# Patient Record
Sex: FEMALE | Race: WHITE | NOT HISPANIC OR LATINO | URBAN - METROPOLITAN AREA
[De-identification: names, ages, dates, MRNs, and addresses within clinical notes are randomized per-mention and may not be internally consistent; named-entity substitution may affect disease eponyms.]

---

## 2023-09-11 PROBLEM — L40.9 PSORIASIS: Status: ACTIVE | Noted: 2023-09-11

## 2023-09-11 PROBLEM — L40.50 PSORIATIC ARTHRITIS OF MULTIPLE JOINTS (MULTI): Status: ACTIVE | Noted: 2023-09-11

## 2023-09-11 PROBLEM — Z85.3 HISTORY OF BREAST CANCER: Status: ACTIVE | Noted: 2023-09-11

## 2023-09-11 PROBLEM — N76.3 CHRONIC VULVITIS: Status: ACTIVE | Noted: 2023-09-11

## 2023-09-11 PROBLEM — N95.2 ATROPHIC VAGINITIS: Status: ACTIVE | Noted: 2023-09-11

## 2023-09-11 PROBLEM — Z85.89 HISTORY OF SQUAMOUS CELL CARCINOMA: Status: ACTIVE | Noted: 2023-09-11

## 2023-09-11 RX ORDER — DESONIDE 0.5 MG/ML
LOTION TOPICAL
COMMUNITY

## 2023-09-11 RX ORDER — COLLAGENASE CLOSTRIDIUM HIST.
POWDER (EA) MISCELLANEOUS
COMMUNITY

## 2023-09-11 RX ORDER — BUTYROSPERMUM PARKII(SHEA BUTTER), SIMMONDSIA CHINENSIS (JOJOBA) SEED OIL, ALOE BARBADENSIS LEAF EXTRACT .01; 1; 3.5 G/100G; G/100G; G/100G
1 LIQUID TOPICAL 2 TIMES DAILY
COMMUNITY

## 2023-09-11 RX ORDER — APREMILAST 30 MG/1
30 TABLET, FILM COATED ORAL 2 TIMES DAILY
COMMUNITY

## 2023-09-11 RX ORDER — ESTRADIOL 0.1 MG/G
1 CREAM VAGINAL 2 TIMES WEEKLY
COMMUNITY
Start: 2022-06-06

## 2023-09-11 RX ORDER — CLOTRIMAZOLE AND BETAMETHASONE DIPROPIONATE 10; .64 MG/G; MG/G
1 CREAM TOPICAL 2 TIMES DAILY
COMMUNITY
Start: 2022-06-06

## 2023-09-25 ENCOUNTER — HOSPITAL ENCOUNTER (OUTPATIENT)
Dept: DATA CONVERSION | Facility: HOSPITAL | Age: 58
Discharge: HOME | End: 2023-09-25
Payer: COMMERCIAL

## 2023-09-25 DIAGNOSIS — Z12.31 ENCOUNTER FOR SCREENING MAMMOGRAM FOR MALIGNANT NEOPLASM OF BREAST: ICD-10-CM

## 2024-06-17 ENCOUNTER — HOSPITAL ENCOUNTER (OUTPATIENT)
Dept: RADIOLOGY | Facility: CLINIC | Age: 59
Discharge: HOME | End: 2024-06-17
Payer: COMMERCIAL

## 2024-06-17 ENCOUNTER — OFFICE VISIT (OUTPATIENT)
Dept: OBSTETRICS AND GYNECOLOGY | Facility: CLINIC | Age: 59
End: 2024-06-17
Payer: COMMERCIAL

## 2024-06-17 VITALS — WEIGHT: 208 LBS | BODY MASS INDEX: 32.65 KG/M2 | HEIGHT: 67 IN

## 2024-06-17 VITALS
BODY MASS INDEX: 32.71 KG/M2 | WEIGHT: 208.4 LBS | DIASTOLIC BLOOD PRESSURE: 80 MMHG | SYSTOLIC BLOOD PRESSURE: 132 MMHG | HEIGHT: 67 IN

## 2024-06-17 DIAGNOSIS — Z01.419 VISIT FOR GYNECOLOGIC EXAMINATION: Primary | ICD-10-CM

## 2024-06-17 DIAGNOSIS — Z90.710 HISTORY OF HYSTERECTOMY: ICD-10-CM

## 2024-06-17 DIAGNOSIS — Z85.3 HISTORY OF BREAST CANCER: ICD-10-CM

## 2024-06-17 DIAGNOSIS — N76.3 CHRONIC VULVITIS: ICD-10-CM

## 2024-06-17 DIAGNOSIS — Z12.11 SCREEN FOR COLON CANCER: ICD-10-CM

## 2024-06-17 DIAGNOSIS — Z12.31 ENCOUNTER FOR SCREENING MAMMOGRAM FOR MALIGNANT NEOPLASM OF BREAST: ICD-10-CM

## 2024-06-17 DIAGNOSIS — L40.9 PSORIASIS: ICD-10-CM

## 2024-06-17 DIAGNOSIS — N95.2 POSTMENOPAUSAL ATROPHIC VAGINITIS: ICD-10-CM

## 2024-06-17 PROCEDURE — 77063 BREAST TOMOSYNTHESIS BI: CPT | Performed by: RADIOLOGY

## 2024-06-17 PROCEDURE — 99396 PREV VISIT EST AGE 40-64: CPT | Performed by: OBSTETRICS & GYNECOLOGY

## 2024-06-17 PROCEDURE — 77067 SCR MAMMO BI INCL CAD: CPT

## 2024-06-17 PROCEDURE — 1036F TOBACCO NON-USER: CPT | Performed by: OBSTETRICS & GYNECOLOGY

## 2024-06-17 PROCEDURE — 77067 SCR MAMMO BI INCL CAD: CPT | Performed by: RADIOLOGY

## 2024-06-17 RX ORDER — ESTRADIOL 0.1 MG/G
1 CREAM VAGINAL 2 TIMES WEEKLY
Qty: 42.5 G | Refills: 2 | Status: SHIPPED | OUTPATIENT
Start: 2024-06-17

## 2024-06-17 ASSESSMENT — ENCOUNTER SYMPTOMS
DEPRESSION: 0
LOSS OF SENSATION IN FEET: 0
ABDOMINAL DISTENTION: 0
ABDOMINAL PAIN: 0
DIZZINESS: 0
DIFFICULTY URINATING: 0
CHEST TIGHTNESS: 0
FATIGUE: 0
COLOR CHANGE: 0
UNEXPECTED WEIGHT CHANGE: 0
SHORTNESS OF BREATH: 0
ACTIVITY CHANGE: 0
HEADACHES: 0
JOINT SWELLING: 0
DYSURIA: 0
ADENOPATHY: 0
WEAKNESS: 0
OCCASIONAL FEELINGS OF UNSTEADINESS: 0

## 2024-06-17 ASSESSMENT — PATIENT HEALTH QUESTIONNAIRE - PHQ9
SUM OF ALL RESPONSES TO PHQ9 QUESTIONS 1 & 2: 0
1. LITTLE INTEREST OR PLEASURE IN DOING THINGS: NOT AT ALL
2. FEELING DOWN, DEPRESSED OR HOPELESS: NOT AT ALL

## 2024-06-17 ASSESSMENT — LIFESTYLE VARIABLES
HOW OFTEN DO YOU HAVE A DRINK CONTAINING ALCOHOL: 2-4 TIMES A MONTH
HOW MANY STANDARD DRINKS CONTAINING ALCOHOL DO YOU HAVE ON A TYPICAL DAY: 1 OR 2
AUDIT-C TOTAL SCORE: 2
HOW OFTEN DO YOU HAVE SIX OR MORE DRINKS ON ONE OCCASION: NEVER
SKIP TO QUESTIONS 9-10: 1

## 2024-06-17 ASSESSMENT — PAIN SCALES - GENERAL: PAINLEVEL: 0-NO PAIN

## 2024-06-17 NOTE — PROGRESS NOTES
"Annual-menopause  Subjective   Katlin Bell \"Jodi\" is a 58 y.o. former pt Dr. Iraheta, who is here for a routine exam.   Complaints:  denies vag bleed or discharge; denies pelvic  pain, pressure, or persistent bloating.  PMHx: LEFT triple neg breast CA/surg [facility]- [facility]cology-Dr. Ellsworth- at age 36 lumpec/chemo/xrt.      squamous cell CA nose. Psoriasis. Dr. Samayoa       Endometriosis; JOVITA/BSO  per LV       2014 ,2019 polyps/divertics each time; rpt due 2024.       hematuria, hx of cystoscopy 2014 Dr. Gayle.        earing loss right ear.  Surg Hx: JOVITA BSO- Dr. Iraheta 2004        TONSILLECTOMY        LUMPECTOMY- Dr. Escobedo 1/2002        sigmoidoscopy--Dr. Kumar 2010        Dr. Arias removed squamous cell CA nose 2011  Pt adopted   Review of Systems   Constitutional:  Negative for activity change, fatigue and unexpected weight change.   Respiratory:  Negative for chest tightness and shortness of breath.    Cardiovascular:  Negative for chest pain and leg swelling.   Gastrointestinal:  Negative for abdominal distention and abdominal pain.   Genitourinary:  Negative for difficulty urinating, dysuria, genital sores, pelvic pain, vaginal bleeding, vaginal discharge and vaginal pain.   Musculoskeletal:  Negative for gait problem and joint swelling.   Skin:  Negative for color change and rash.   Neurological:  Negative for dizziness, weakness and headaches.   Hematological:  Negative for adenopathy.   Objective   /80   Ht 1.702 m (5' 7\")   Wt 94.5 kg (208 lb 6.4 oz)   BMI 32.64 kg/m²    General:   Alert and oriented, in no acute distress   Neck: Supple. No visible thyromegaly.    Breast/Axilla: LEFT chest oblique incision crosses entire upper half breast/another incision along axilla from lnd; Normal to palpation bilaterally without masses, skin changes, or nipple discharge.    Abdomen: Soft, non-tender, without masses or organomegaly   Vulva: RT minora has white/gray/rough mucosa midway, approx 1cm; atrophic " red mucosa inside both labia; bottom of interlabial creases have fissuring   Vagina: Normal vault capacity ;mucosa without lesions, masses.  Positive atrophy. No abnormal vaginal discharge.    Cervix: Surg absent   Uterus: Surg absent   Adnexa: Surg absent   Pelvic Floor No POP noted. No high tone pelvic floor    Psych  Rectal Normal affect. Normal mood.     Assessment/Plan   Encounter Diagnoses   Name Primary?    Visit for gynecologic examination; grossly benign breast exam; gyn exam pos for atrophy/posspsoriasis vs lichen sclerosus R minora. Yes    Encounter for screening mammogram for malignant neoplasm of breast; req given     History of breast cancer; no  evid dz.     Postmenopausal atrophic vaginitis; wanting to cont vag ert     Chronic vulvitis;improved w tx.     Psoriasis     History of hysterectomy; no further paps     Screen for colon cancer; utd and neg per pt     Renetta Carson MD

## 2024-09-30 ENCOUNTER — LAB (OUTPATIENT)
Dept: LAB | Facility: LAB | Age: 59
End: 2024-09-30
Payer: COMMERCIAL

## 2024-09-30 DIAGNOSIS — L40.50 ARTHROPATHIC PSORIASIS, UNSPECIFIED (MULTI): ICD-10-CM

## 2024-09-30 DIAGNOSIS — L40.0 PSORIASIS VULGARIS: Primary | ICD-10-CM

## 2024-09-30 LAB
ALBUMIN SERPL BCP-MCNC: 4.5 G/DL (ref 3.4–5)
ALP SERPL-CCNC: 48 U/L (ref 33–136)
ALT SERPL W P-5'-P-CCNC: 17 U/L (ref 7–45)
ANION GAP SERPL CALCULATED.3IONS-SCNC: 10 MMOL/L (ref 10–20)
APPEARANCE UR: CLEAR
AST SERPL W P-5'-P-CCNC: 17 U/L (ref 9–39)
BASOPHILS # BLD AUTO: 0.08 X10*3/UL (ref 0–0.1)
BASOPHILS NFR BLD AUTO: 1 %
BILIRUB SERPL-MCNC: 0.5 MG/DL (ref 0–1.2)
BILIRUB UR STRIP.AUTO-MCNC: NEGATIVE MG/DL
BUN SERPL-MCNC: 13 MG/DL (ref 6–23)
CALCIUM SERPL-MCNC: 9.8 MG/DL (ref 8.6–10.3)
CHLORIDE SERPL-SCNC: 105 MMOL/L (ref 98–107)
CO2 SERPL-SCNC: 28 MMOL/L (ref 21–32)
COLOR UR: COLORLESS
CREAT SERPL-MCNC: 0.93 MG/DL (ref 0.5–1.05)
EGFRCR SERPLBLD CKD-EPI 2021: 71 ML/MIN/1.73M*2
EOSINOPHIL # BLD AUTO: 0.17 X10*3/UL (ref 0–0.7)
EOSINOPHIL NFR BLD AUTO: 2.2 %
ERYTHROCYTE [DISTWIDTH] IN BLOOD BY AUTOMATED COUNT: 12.6 % (ref 11.5–14.5)
GLUCOSE SERPL-MCNC: 97 MG/DL (ref 74–99)
GLUCOSE UR STRIP.AUTO-MCNC: NORMAL MG/DL
HCT VFR BLD AUTO: 41.5 % (ref 36–46)
HGB BLD-MCNC: 13.6 G/DL (ref 12–16)
IMM GRANULOCYTES # BLD AUTO: 0.02 X10*3/UL (ref 0–0.7)
IMM GRANULOCYTES NFR BLD AUTO: 0.3 % (ref 0–0.9)
KETONES UR STRIP.AUTO-MCNC: NEGATIVE MG/DL
LEUKOCYTE ESTERASE UR QL STRIP.AUTO: ABNORMAL
LYMPHOCYTES # BLD AUTO: 2.11 X10*3/UL (ref 1.2–4.8)
LYMPHOCYTES NFR BLD AUTO: 27.2 %
MCH RBC QN AUTO: 29.9 PG (ref 26–34)
MCHC RBC AUTO-ENTMCNC: 32.8 G/DL (ref 32–36)
MCV RBC AUTO: 91 FL (ref 80–100)
MONOCYTES # BLD AUTO: 0.46 X10*3/UL (ref 0.1–1)
MONOCYTES NFR BLD AUTO: 5.9 %
MUCOUS THREADS #/AREA URNS AUTO: NORMAL /LPF
NEUTROPHILS # BLD AUTO: 4.92 X10*3/UL (ref 1.2–7.7)
NEUTROPHILS NFR BLD AUTO: 63.4 %
NITRITE UR QL STRIP.AUTO: NEGATIVE
NRBC BLD-RTO: 0 /100 WBCS (ref 0–0)
PH UR STRIP.AUTO: 5 [PH]
PLATELET # BLD AUTO: 321 X10*3/UL (ref 150–450)
POTASSIUM SERPL-SCNC: 4.2 MMOL/L (ref 3.5–5.3)
PROT SERPL-MCNC: 7.2 G/DL (ref 6.4–8.2)
PROT UR STRIP.AUTO-MCNC: NEGATIVE MG/DL
RBC # BLD AUTO: 4.55 X10*6/UL (ref 4–5.2)
RBC # UR STRIP.AUTO: ABNORMAL /UL
RBC #/AREA URNS AUTO: NORMAL /HPF
SODIUM SERPL-SCNC: 139 MMOL/L (ref 136–145)
SP GR UR STRIP.AUTO: 1.01
UROBILINOGEN UR STRIP.AUTO-MCNC: NORMAL MG/DL
WBC # BLD AUTO: 7.8 X10*3/UL (ref 4.4–11.3)
WBC #/AREA URNS AUTO: NORMAL /HPF

## 2024-09-30 PROCEDURE — 85025 COMPLETE CBC W/AUTO DIFF WBC: CPT

## 2024-09-30 PROCEDURE — 86481 TB AG RESPONSE T-CELL SUSP: CPT

## 2024-09-30 PROCEDURE — 87389 HIV-1 AG W/HIV-1&-2 AB AG IA: CPT

## 2024-09-30 PROCEDURE — 86780 TREPONEMA PALLIDUM: CPT

## 2024-09-30 PROCEDURE — 36415 COLL VENOUS BLD VENIPUNCTURE: CPT

## 2024-09-30 PROCEDURE — 87340 HEPATITIS B SURFACE AG IA: CPT

## 2024-09-30 PROCEDURE — 86803 HEPATITIS C AB TEST: CPT

## 2024-09-30 PROCEDURE — 81001 URINALYSIS AUTO W/SCOPE: CPT

## 2024-09-30 PROCEDURE — 80053 COMPREHEN METABOLIC PANEL: CPT

## 2024-10-01 LAB
HBV SURFACE AG SERPL QL IA: NONREACTIVE
HCV AB SER QL: NONREACTIVE
HIV 1+2 AB+HIV1 P24 AG SERPL QL IA: NONREACTIVE
TREPONEMA PALLIDUM IGG+IGM AB [PRESENCE] IN SERUM OR PLASMA BY IMMUNOASSAY: NONREACTIVE

## 2024-10-03 LAB
NIL(NEG) CONTROL SPOT COUNT: NORMAL
PANEL A SPOT COUNT: 1
PANEL B SPOT COUNT: 1
POS CONTROL SPOT COUNT: NORMAL
T-SPOT. TB INTERPRETATION: NEGATIVE

## 2024-10-08 ENCOUNTER — APPOINTMENT (OUTPATIENT)
Dept: PRIMARY CARE | Facility: CLINIC | Age: 59
End: 2024-10-08
Payer: COMMERCIAL

## 2024-11-07 PROBLEM — L03.90 CELLULITIS: Status: RESOLVED | Noted: 2024-11-07 | Resolved: 2024-11-07

## 2024-11-07 PROBLEM — E66.9 OBESITY: Status: ACTIVE | Noted: 2024-11-07

## 2024-11-07 PROBLEM — E87.5 HYPERKALEMIA: Status: RESOLVED | Noted: 2024-11-07 | Resolved: 2024-11-07

## 2024-11-07 PROBLEM — J30.9 ALLERGIC RHINITIS: Status: ACTIVE | Noted: 2024-11-07

## 2024-11-07 PROBLEM — J32.9 SINUSITIS: Status: ACTIVE | Noted: 2024-11-07

## 2024-11-08 ENCOUNTER — OFFICE VISIT (OUTPATIENT)
Dept: PRIMARY CARE | Facility: CLINIC | Age: 59
End: 2024-11-08
Payer: COMMERCIAL

## 2024-11-08 VITALS
HEIGHT: 67 IN | DIASTOLIC BLOOD PRESSURE: 80 MMHG | BODY MASS INDEX: 32.8 KG/M2 | HEART RATE: 75 BPM | SYSTOLIC BLOOD PRESSURE: 118 MMHG | OXYGEN SATURATION: 99 % | WEIGHT: 209 LBS

## 2024-11-08 DIAGNOSIS — Z12.31 ENCOUNTER FOR SCREENING MAMMOGRAM FOR BREAST CANCER: ICD-10-CM

## 2024-11-08 DIAGNOSIS — Z23 ENCOUNTER FOR IMMUNIZATION: ICD-10-CM

## 2024-11-08 DIAGNOSIS — J31.0 CHRONIC RHINITIS: ICD-10-CM

## 2024-11-08 DIAGNOSIS — R73.9 HYPERGLYCEMIA: ICD-10-CM

## 2024-11-08 DIAGNOSIS — Z12.11 ENCOUNTER FOR COLORECTAL CANCER SCREENING: ICD-10-CM

## 2024-11-08 DIAGNOSIS — Z00.00 ANNUAL PHYSICAL EXAM: Primary | ICD-10-CM

## 2024-11-08 DIAGNOSIS — Z85.3 HISTORY OF BREAST CANCER: ICD-10-CM

## 2024-11-08 DIAGNOSIS — Z01.89 ENCOUNTER FOR ROUTINE LABORATORY TESTING: ICD-10-CM

## 2024-11-08 DIAGNOSIS — L40.9 PSORIASIS: ICD-10-CM

## 2024-11-08 DIAGNOSIS — L40.50 PSORIATIC ARTHRITIS (MULTI): ICD-10-CM

## 2024-11-08 DIAGNOSIS — E55.9 VITAMIN D DEFICIENCY: ICD-10-CM

## 2024-11-08 DIAGNOSIS — Z12.12 ENCOUNTER FOR COLORECTAL CANCER SCREENING: ICD-10-CM

## 2024-11-08 DIAGNOSIS — R53.82 CHRONIC FATIGUE: ICD-10-CM

## 2024-11-08 DIAGNOSIS — E78.2 MIXED HYPERLIPIDEMIA: ICD-10-CM

## 2024-11-08 PROBLEM — J32.9 SINUSITIS: Status: RESOLVED | Noted: 2024-11-07 | Resolved: 2024-11-08

## 2024-11-08 PROCEDURE — 3008F BODY MASS INDEX DOCD: CPT | Performed by: STUDENT IN AN ORGANIZED HEALTH CARE EDUCATION/TRAINING PROGRAM

## 2024-11-08 PROCEDURE — 99213 OFFICE O/P EST LOW 20 MIN: CPT | Performed by: STUDENT IN AN ORGANIZED HEALTH CARE EDUCATION/TRAINING PROGRAM

## 2024-11-08 PROCEDURE — 1036F TOBACCO NON-USER: CPT | Performed by: STUDENT IN AN ORGANIZED HEALTH CARE EDUCATION/TRAINING PROGRAM

## 2024-11-08 PROCEDURE — 99396 PREV VISIT EST AGE 40-64: CPT | Performed by: STUDENT IN AN ORGANIZED HEALTH CARE EDUCATION/TRAINING PROGRAM

## 2024-11-08 RX ORDER — IXEKIZUMAB 80 MG/ML
INJECTION, SOLUTION SUBCUTANEOUS
COMMUNITY
Start: 2024-10-28

## 2024-11-08 RX ORDER — OMEPRAZOLE 20 MG/1
20 TABLET, DELAYED RELEASE ORAL
COMMUNITY

## 2024-11-08 ASSESSMENT — PATIENT HEALTH QUESTIONNAIRE - PHQ9
2. FEELING DOWN, DEPRESSED OR HOPELESS: NOT AT ALL
SUM OF ALL RESPONSES TO PHQ9 QUESTIONS 1 AND 2: 0
1. LITTLE INTEREST OR PLEASURE IN DOING THINGS: NOT AT ALL

## 2024-11-08 ASSESSMENT — PROMIS GLOBAL HEALTH SCALE
EMOTIONAL_PROBLEMS: RARELY
CARRYOUT_PHYSICAL_ACTIVITIES: COMPLETELY
RATE_QUALITY_OF_LIFE: EXCELLENT
RATE_PHYSICAL_HEALTH: VERY GOOD
RATE_GENERAL_HEALTH: VERY GOOD
RATE_SOCIAL_SATISFACTION: EXCELLENT
RATE_MENTAL_HEALTH: EXCELLENT
RATE_AVERAGE_PAIN: 0
RATE_AVERAGE_FATIGUE: MODERATE
CARRYOUT_SOCIAL_ACTIVITIES: VERY GOOD

## 2024-11-08 ASSESSMENT — ENCOUNTER SYMPTOMS
ALLERGIC/IMMUNOLOGIC NEGATIVE: 1
CONSTITUTIONAL NEGATIVE: 1
GASTROINTESTINAL NEGATIVE: 1
MUSCULOSKELETAL NEGATIVE: 1
CARDIOVASCULAR NEGATIVE: 1
PSYCHIATRIC NEGATIVE: 1
HEMATOLOGIC/LYMPHATIC NEGATIVE: 1
EYES NEGATIVE: 1
RESPIRATORY NEGATIVE: 1
ENDOCRINE NEGATIVE: 1
NEUROLOGICAL NEGATIVE: 1

## 2024-11-08 ASSESSMENT — PAIN SCALES - GENERAL: PAINLEVEL_OUTOF10: 0-NO PAIN

## 2024-11-08 NOTE — PROGRESS NOTES
[facility]: [facility]  PHYSICAL EXAM      Katlin Bell is a 59 y.o. female that is presenting today for Annual Exam.    Assessment/Plan   - Overall, the patient feels well and denies any acute symptoms / concerns at this time.  - Blood pressure at goal today.  - Encouraged continued dietary, exercise, and lifestyle modification.  - Significant medication and problem list reconciliation done today.     Discussed routine and/or preventative care with the patient as outlined below:  - Labwork:   - Patient appears to be due for labwork. Ordered today.   - Will order labwork for the patient's next appointment. Encouraged the patient to get this labwork done one week prior to the next appointment.  - Imaging:   - Colorectal Cancer: Patient is due for this right about now. Last done in 2019 with recommended five year follow-up.  - Mammogram: Not due until June 2025.  - Immunizations:   - Discussed seasonal immunizations, including the influenza and COVID-19 immunizations.  - Patient does not appear to be due for routine immunizations at this time.     Diagnoses and all orders for this visit:  Annual physical exam  -     Follow Up In Primary Care; Future  Encounter for screening mammogram for breast cancer  Encounter for colorectal cancer screening  Encounter for routine laboratory testing  Mixed hyperlipidemia  -     Hepatic Function Panel; Future  -     Lipid Panel; Future  -     Lipid Panel; Future  Vitamin D deficiency  -     Vitamin D 25-Hydroxy,Total (for eval of Vitamin D levels); Future  -     Vitamin D 25-Hydroxy,Total (for eval of Vitamin D levels); Future  Hyperglycemia  -     Hemoglobin A1C; Future  -     Hemoglobin A1C; Future  Chronic fatigue  -     CBC and Auto Differential; Future  -     Basic Metabolic Panel; Future  -     TSH with reflex to Free T4 if abnormal; Future  -     TSH with reflex to Free T4 if abnormal; Future  Encounter for immunization  Psoriatic arthritis  (Multi)  History of breast cancer  Chronic rhinitis  Psoriasis    Current Outpatient Medications   Medication Instructions    multivitamin (MULTIPLE VITAMINS ORAL) Take by mouth.    omeprazole OTC (PRILOSEC OTC) 20 mg, Daily before breakfast    Taltz Autoinjector, 2 Pack, 80 mg/mL injection Every 14 days     Subjective   - The patient otherwise feels well and denies any acute symptoms or concerns at this time.  - The patient denies any changes or progression of their chronic medical problems.  - The patient denies any problems or concerns with their medications.      Review of Systems   Constitutional: Negative.    HENT: Negative.     Eyes: Negative.    Respiratory: Negative.     Cardiovascular: Negative.    Gastrointestinal: Negative.    Endocrine: Negative.    Genitourinary: Negative.    Musculoskeletal: Negative.    Skin: Negative.    Allergic/Immunologic: Negative.    Neurological: Negative.    Hematological: Negative.    Psychiatric/Behavioral: Negative.     All other systems reviewed and are negative.     Objective   Vitals:    11/08/24 1100   BP: 118/80   Pulse: 75   SpO2: 99%     Body mass index is 32.73 kg/m².  Physical Exam  Vitals and nursing note reviewed.   Constitutional:       General: She is not in acute distress.     Appearance: Normal appearance. She is not ill-appearing.   HENT:      Head: Normocephalic and atraumatic.      Right Ear: Tympanic membrane, ear canal and external ear normal. There is no impacted cerumen.      Left Ear: Tympanic membrane, ear canal and external ear normal. There is no impacted cerumen.      Nose: Nose normal.      Mouth/Throat:      Mouth: Mucous membranes are moist.      Pharynx: Oropharynx is clear. No oropharyngeal exudate or posterior oropharyngeal erythema.   Eyes:      General: No scleral icterus.        Right eye: No discharge.         Left eye: No discharge.      Extraocular Movements: Extraocular movements intact.      Conjunctiva/sclera: Conjunctivae normal.       Pupils: Pupils are equal, round, and reactive to light.   Neck:      Vascular: No carotid bruit.   Cardiovascular:      Rate and Rhythm: Normal rate and regular rhythm.      Pulses: Normal pulses.      Heart sounds: Normal heart sounds. No murmur heard.     No friction rub. No gallop.   Pulmonary:      Effort: Pulmonary effort is normal. No respiratory distress.      Breath sounds: Normal breath sounds.   Abdominal:      General: Abdomen is flat. Bowel sounds are normal. There is no distension.      Palpations: Abdomen is soft.      Tenderness: There is no abdominal tenderness.      Hernia: No hernia is present.   Musculoskeletal:         General: No swelling or tenderness. Normal range of motion.   Lymphadenopathy:      Cervical: No cervical adenopathy.   Skin:     General: Skin is warm and dry.      Capillary Refill: Capillary refill takes less than 2 seconds.      Coloration: Skin is not jaundiced.      Findings: No rash.   Neurological:      General: No focal deficit present.      Mental Status: She is alert and oriented to person, place, and time. Mental status is at baseline.   Psychiatric:         Mood and Affect: Mood normal.         Behavior: Behavior normal.       Diagnostic Results   Lab Results   Component Value Date    GLUCOSE 97 09/30/2024    CALCIUM 9.8 09/30/2024     09/30/2024    K 4.2 09/30/2024    CO2 28 09/30/2024     09/30/2024    BUN 13 09/30/2024    CREATININE 0.93 09/30/2024     Lab Results   Component Value Date    ALT 17 09/30/2024    AST 17 09/30/2024    ALKPHOS 48 09/30/2024    BILITOT 0.5 09/30/2024     Lab Results   Component Value Date    WBC 7.8 09/30/2024    HGB 13.6 09/30/2024    HCT 41.5 09/30/2024    MCV 91 09/30/2024     09/30/2024     Lab Results   Component Value Date    CHOL 253 (H) 12/28/2022    CHOL 189 11/25/2020    CHOL 163 08/15/2018     Lab Results   Component Value Date    HDL 56 12/28/2022    HDL 45 (L) 11/25/2020    HDL 47 (L) 08/15/2018  "    Lab Results   Component Value Date    LDLCALC 150 (H) 12/28/2022    LDLCALC 121 11/25/2020    LDLCALC 93 08/15/2018     Lab Results   Component Value Date    TRIG 237 (H) 12/28/2022    TRIG 116 11/25/2020    TRIG 115 08/15/2018     No components found for: \"CHOLHDL\"  Lab Results   Component Value Date    HGBA1C 5.3 12/28/2022     Other labs not included in the list above were reviewed either before or during this encounter.    History   Past Medical History:   Diagnosis Date    Breast cancer (Multi)     Left breast    Cellulitis 11/07/2024    Endometriosis     Hearing loss     Hematuria 2014    hx of cystoscopy; Dr Gayle    Hx antineoplastic chemo     Hyperkalemia 11/07/2024    Personal history of irradiation     Psoriasis     Squamous cell cancer of skin of nose      Past Surgical History:   Procedure Laterality Date    BREAST BIOPSY Left     BREAST LUMPECTOMY Left 01/2002    LYMPH NODES REMOVED; Dr Escobedo    FLEXIBLE SIGMOIDOSCOPY  2010    HYSTERECTOMY      SKIN CANCER EXCISION  2011    squamous cell CA, from nose; Dr Arias    TONSILLECTOMY       Family History   Adopted: Yes     Social History     Socioeconomic History    Marital status:      Spouse name: Not on file    Number of children: Not on file    Years of education: Not on file    Highest education level: Not on file   Occupational History    Not on file   Tobacco Use    Smoking status: Never    Smokeless tobacco: Never   Vaping Use    Vaping status: Never Used   Substance and Sexual Activity    Alcohol use: Yes    Drug use: Never    Sexual activity: Yes     Partners: Male     Birth control/protection: Female Sterilization   Other Topics Concern    Not on file   Social History Narrative    Not on file     Social Drivers of Health     Financial Resource Strain: Not on file   Food Insecurity: Not on file   Transportation Needs: Not on file   Physical Activity: Not on file   Stress: Not on file   Social Connections: Not on file   Intimate " Partner Violence: Not on file   Housing Stability: Not on file     Allergies   Allergen Reactions    Penicillin Anaphylaxis    Fluconazole Hives    Terbinafine Hcl Other     achey     Current Outpatient Medications on File Prior to Visit   Medication Sig Dispense Refill    multivitamin (MULTIPLE VITAMINS ORAL) Take by mouth.      omeprazole OTC (PriLOSEC OTC) 20 mg EC tablet Take 1 tablet (20 mg) by mouth once daily in the morning. Take before meals. Do not crush, chew, or split.      Taltz Autoinjector, 2 Pack, 80 mg/mL injection Inject under the skin every 14 (fourteen) days.      [DISCONTINUED] apremilast (Otezla) 30 mg tablet Take 1 tablet (30 mg) by mouth 2 times a day. (Patient not taking: Reported on 11/8/2024)      [DISCONTINUED] clotrimazole-betamethasone (Lotrisone) cream Apply 1 Application topically 2 times a day. (Patient not taking: Reported on 11/8/2024)      [DISCONTINUED] collagenase Clostridium hist. (collagenase clos hist., bulk,) powder as directed (Patient not taking: Reported on 11/8/2024)      [DISCONTINUED] desonide (DesOwen) 0.05 % lotion 1 application a thin film to affected area Externally Twice a day, prn (Patient not taking: Reported on 11/8/2024)      [DISCONTINUED] estradiol (Estrace) 0.01 % (0.1 mg/gram) vaginal cream Insert 0.25 Applicatorfuls (1 g) into the vagina 2 times a week. (Patient not taking: Reported on 11/8/2024) 42.5 g 2    [DISCONTINUED] saccharomyces boulardii (Florastor) 250 mg capsule Take 1 capsule (250 mg) by mouth 2 times a day. (Patient not taking: Reported on 11/8/2024)       No current facility-administered medications on file prior to visit.     Immunization History   Administered Date(s) Administered    Flu vaccine (IIV4), preservative free *Check age/dose* 09/22/2017, 11/04/2019, 10/05/2020, 10/18/2021, 10/23/2022    Flu vaccine, quadrivalent, no egg protein, age 6 month or greater (FLUCELVAX) 10/15/2018    Flu vaccine, trivalent, preservative free, age 6  months and greater (Fluarix/Fluzone/Flulaval) 10/10/2024    Influenza, injectable, quadrivalent 10/30/2017    Influenza, seasonal, injectable 01/21/2013, 10/15/2013, 09/12/2014, 10/01/2014, 10/22/2015    Moderna SARS-CoV-2 Vaccination 03/31/2021, 04/28/2021, 11/17/2021    Pfizer COVID-19 vaccine, 12 years and older, (30mcg/0.3mL) (Comirnaty) 01/16/2024    Pfizer COVID-19 vaccine, bivalent, age 12 years and older (30 mcg/0.3 mL) 10/23/2022    Tdap vaccine, age 7 year and older (BOOSTRIX, ADACEL) 03/25/2011, 03/17/2016    Zoster vaccine, recombinant, adult (SHINGRIX) 06/06/2022, 09/26/2022     Patient's medical history was reviewed and updated either before or during this encounter.       Meir Nino MD

## 2024-11-08 NOTE — PATIENT INSTRUCTIONS
- Overall, the patient feels well and denies any acute symptoms / concerns at this time.  - Blood pressure at goal today.  - Encouraged continued dietary, exercise, and lifestyle modification.  - Significant medication and problem list reconciliation done today.     Discussed routine and/or preventative care with the patient as outlined below:  - Labwork:   - Patient appears to be due for labwork. Ordered today.   - Will order labwork for the patient's next appointment. Encouraged the patient to get this labwork done one week prior to the next appointment.  - Imaging:   - Colorectal Cancer: Patient is due for this right about now. Last done in 2019 with recommended five year follow-up.  - Mammogram: Not due until June 2025.  - Immunizations:   - Discussed seasonal immunizations, including the influenza and COVID-19 immunizations.  - Patient does not appear to be due for routine immunizations at this time.

## 2024-12-16 ENCOUNTER — LAB (OUTPATIENT)
Dept: LAB | Facility: LAB | Age: 59
End: 2024-12-16
Payer: COMMERCIAL

## 2024-12-16 DIAGNOSIS — R53.82 CHRONIC FATIGUE: ICD-10-CM

## 2024-12-16 DIAGNOSIS — E78.2 MIXED HYPERLIPIDEMIA: ICD-10-CM

## 2024-12-16 DIAGNOSIS — R73.9 HYPERGLYCEMIA: ICD-10-CM

## 2024-12-16 DIAGNOSIS — E55.9 VITAMIN D DEFICIENCY: ICD-10-CM

## 2024-12-16 LAB
25(OH)D3 SERPL-MCNC: 35 NG/ML (ref 30–100)
CHOLEST SERPL-MCNC: 194 MG/DL (ref 0–199)
CHOLESTEROL/HDL RATIO: 4
EST. AVERAGE GLUCOSE BLD GHB EST-MCNC: 108 MG/DL
HBA1C MFR BLD: 5.4 %
HDLC SERPL-MCNC: 48.8 MG/DL
LDLC SERPL CALC-MCNC: 106 MG/DL
NON HDL CHOLESTEROL: 145 MG/DL (ref 0–149)
TRIGL SERPL-MCNC: 198 MG/DL (ref 0–149)
TSH SERPL-ACNC: 2.59 MIU/L (ref 0.44–3.98)
VLDL: 40 MG/DL (ref 0–40)

## 2024-12-16 PROCEDURE — 82306 VITAMIN D 25 HYDROXY: CPT

## 2024-12-16 PROCEDURE — 36415 COLL VENOUS BLD VENIPUNCTURE: CPT

## 2024-12-16 PROCEDURE — 83036 HEMOGLOBIN GLYCOSYLATED A1C: CPT

## 2024-12-27 ENCOUNTER — APPOINTMENT (OUTPATIENT)
Dept: OTOLARYNGOLOGY | Facility: CLINIC | Age: 59
End: 2024-12-27
Payer: COMMERCIAL

## 2024-12-27 ENCOUNTER — APPOINTMENT (OUTPATIENT)
Dept: AUDIOLOGY | Facility: CLINIC | Age: 59
End: 2024-12-27
Payer: COMMERCIAL

## 2025-01-06 ENCOUNTER — TELEMEDICINE (OUTPATIENT)
Dept: PRIMARY CARE | Facility: CLINIC | Age: 60
End: 2025-01-06
Payer: COMMERCIAL

## 2025-01-06 DIAGNOSIS — R05.1 ACUTE COUGH: Primary | ICD-10-CM

## 2025-01-06 PROCEDURE — 1036F TOBACCO NON-USER: CPT

## 2025-01-06 PROCEDURE — 99213 OFFICE O/P EST LOW 20 MIN: CPT

## 2025-01-06 ASSESSMENT — ENCOUNTER SYMPTOMS
SHORTNESS OF BREATH: 0
MYALGIAS: 0
RHINORRHEA: 0
COUGH: 1
HEADACHES: 1
SORE THROAT: 0
CHILLS: 0
FEVER: 0

## 2025-01-06 NOTE — PROGRESS NOTES
Subjective   Patient ID: Katlin Bell is a 59 y.o. female who presents for cough.    With patient's permission, this is a Telemedicine visit with video and audio. Provider located at office address. Patient located at their home address. All issues as documented below were discussed and addressed but limited physical exam was performed. If it was felt that the patient should be evaluated via face-to-face office appointment(s) they were directed to appropriate location.     Cough  This is a new problem. The current episode started in the past 7 days (1/3/25). The problem has been unchanged. The cough is Productive of sputum. Associated symptoms include headaches. Pertinent negatives include no chest pain, chills, fever, myalgias, nasal congestion, postnasal drip, rhinorrhea, sore throat or shortness of breath. The symptoms are aggravated by lying down. Treatments tried: Mucinex. The treatment provided mild relief. There is no history of asthma.    is also sick. COVID was negative.     Review of Systems   Constitutional:  Negative for chills and fever.   HENT:  Negative for congestion, postnasal drip, rhinorrhea and sore throat.    Respiratory:  Positive for cough. Negative for shortness of breath.    Cardiovascular:  Negative for chest pain.   Musculoskeletal:  Negative for myalgias.   Neurological:  Positive for headaches.     Objective   There were no vitals taken for this visit.    Physical Exam  Limited due to virtual encounter.   General:  Appears alert and oriented and well-nourished with no signs of acute distress.   Face:  Normal appearing with no obvious neurological deficits.  Eyes:  EOMI x2.  Respiratory:  Breathing is non-labored.   Psychiatric:  Affect is appropriate and shows good judgment.     Assessment/Plan   Assessment & Plan  Acute cough  Acute. Likely viral, provided reassurance.   OTC Tylenol/Ibuprofen as directed for aches. OTC Robitussin as directed for dry cough. OTC Mucinex as  directed for productive cough. Declines Tessalon pearls. Increase fluids, rest, humidifier.   Follow up with PCP/UC if symptoms do not improve within 7-10 days, or sooner for worsening.

## 2025-01-14 ENCOUNTER — APPOINTMENT (OUTPATIENT)
Dept: AUDIOLOGY | Facility: CLINIC | Age: 60
End: 2025-01-14
Payer: COMMERCIAL

## 2025-01-14 ENCOUNTER — DOCUMENTATION (OUTPATIENT)
Dept: AUDIOLOGY | Facility: CLINIC | Age: 60
End: 2025-01-14

## 2025-01-14 DIAGNOSIS — H90.41 SENSORINEURAL HEARING LOSS (SNHL) OF RIGHT EAR WITH UNRESTRICTED HEARING OF LEFT EAR: Primary | ICD-10-CM

## 2025-01-14 DIAGNOSIS — H93.11 TINNITUS OF RIGHT EAR: ICD-10-CM

## 2025-01-14 PROCEDURE — HRANC PR HEARING AID NO CHARGE: Performed by: AUDIOLOGIST

## 2025-01-14 PROCEDURE — 92557 COMPREHENSIVE HEARING TEST: CPT | Performed by: AUDIOLOGIST

## 2025-01-14 PROCEDURE — 92567 TYMPANOMETRY: CPT | Performed by: AUDIOLOGIST

## 2025-01-14 NOTE — PROGRESS NOTES
Name:  Katlin Bell  :  1965  Age:  59 y.o.  Date of Service:  2025    Ms. Bell is coming in for HAE. Insurance was called to check hearing aid benefits 2025.    Are Hearing aids a covered benefit? No hearing aids are not a covered benefit.     Call reference # [id number]

## 2025-01-14 NOTE — PROGRESS NOTES
AUDIOLOGY ADULT AUDIOMETRIC EVALUATION    Name:  Katlin Bell  :  1965  Age:  59 y.o.  Date of Evaluation:  2025    Reason for visit: Ms. Bell is seen in the clinic today for an audiologic evaluation.     HISTORY  The patient has a history of right-sided low frequency sensorineural hearing loss that occurred in 2018.  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      EVALUATION  See scanned audiogram: “Media” > “Audiology Report”.      RESULTS  Otoscopic Evaluation:  Right Ear: clear ear canal  Left Ear: clear ear canal    Immittance Measures:  Tympanometry:  Right Ear: Type A, normal tympanic membrane mobility with normal middle ear pressure   Left Ear: Type A, normal tympanic membrane mobility with normal middle ear pressure     Acoustic Reflexes:  Ipsilateral Right Ear: Could not evaluate since an adequate seal could not be maintained    Ipsilateral Left Ear: Could not evaluate since an adequate seal could not be maintained    Contralateral Right Ear: did not evaluate  Contralateral Left Ear: did not evaluate    Distortion Product Otoacoustic Emissions (DPOAEs):  Right Ear: did not evaluate   Left Ear: did not evaluate     Audiometry:  Test Technique and Reliability:   Standard audiometry via supra-aural headphones. Reliability is good.    Pure tone air and bone conduction audiometry:  Right Ear: moderate to mild sensorineural hearing loss at 250-750 Hz rising to normal hearing at 5847-8066 Hz  Left Ear: normal hearing    Speech Audiometry (Word Recognition Scores):   Right Ear: Excellent, 100%   Left Ear: Excellent, 100%     IMPRESSIONS  Results of today's audiometric evaluation revealed normal hearing in the left ear and a moderate to mild low frequency sensorineural hearing loss in the right ear.  Results are essentially stable compared with the previous hearing evaluation from 2018.       RECOMMENDATIONS  - Annual  audiologic evaluation, sooner if an acute change is noted.  - Hearing aid evaluation.     PATIENT EDUCATION  Discussed results, impressions and recommendations with the patient. Questions were addressed and the patient was encouraged to contact our office should concerns arise.    Time for this encounter: 10:00-10:30    Karen Morgan M.A., CCC-A   Licensed Audiologist

## 2025-01-17 NOTE — PROGRESS NOTES
HEARING AID EVALUATION    The patient was seen today for a hearing aid evaluation.  She has a low frequency sensorineural hearing loss in her right ear and right-sided tinnitus.  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  Discussed the various amplification options with the patient, including the various styles of hearing aids and the differences in technology levels.  Also discussed the possibility of the patient looking into an over-the-counter hearing aid that can be programmed for her hearing loss.  The patient will consider everything and return if she decides to pursue amplification at this facility.  She may be interested in trying a Resound Nexia 9 rechargeable Matt in espresso, and she measures a #1 for her right ear.  I gave her a copy of her audiogram.     APPOINTMENT TIME: 10:30-11:30

## 2025-01-22 ENCOUNTER — TELEPHONE (OUTPATIENT)
Dept: AUDIOLOGY | Facility: CLINIC | Age: 60
End: 2025-01-22
Payer: COMMERCIAL

## 2025-01-22 NOTE — TELEPHONE ENCOUNTER
Returned call to Ms. Bell. Ms. Bell requested that GN Resound Nexia hearing aids discussed at hearing aid evaluation be ordered. A hearing aid fitting was schedule; follow ups will be scheduled once Ms. Bell finds out her schedule for her new job.

## 2025-02-10 ENCOUNTER — APPOINTMENT (OUTPATIENT)
Dept: AUDIOLOGY | Facility: CLINIC | Age: 60
End: 2025-02-10

## 2025-02-10 DIAGNOSIS — H90.41 SENSORINEURAL HEARING LOSS (SNHL) OF RIGHT EAR WITH UNRESTRICTED HEARING OF LEFT EAR: Primary | ICD-10-CM

## 2025-02-10 NOTE — PROGRESS NOTES
HEARING AID FITTING    RIGHT: RESOUND NEXIA 9 RECHARGEABLE JAMSHID WITH A #1 LP  AND MEDIUM OPEN DOME WITH RETENTION TAIL  S.N.: [id number]  LEFT: UNAIDED  WARRANTY EXPIRES: 2/23/2025    Fit the patient with the above listed hearing aid set to 100% for an experienced user.  Reduced the gain at 250 Hz x2 and increased expansion and impulse noise reduction.  Discussed use and care of the hearing aid and practiced inserting the aid and adjusting the volume.  A short press of the button is volume increase and long press is volume decrease.  Showed the patient how to replace the wax guards and had her return demonstrate.  Paired her hearing aid to her cell phone for using the Resound Smart 3D qasim.  Discussed how to make adjustments using the qasim.  The patient is not interested in streaming at this time, but she may want to try it at a follow up appointment.  In addition to today's verbal instruction of the hearing devices, the patient was given written instructions from the hearing aid . Hearing aid limitations were discussed at length as well as realistic expectations. The patient was advised in order to receive full benefit of amplification, consistent use during all waking hours is recommended.  The repair warranty and the conditions of the right-to-return period were discussed. The patient reports understanding of these conditions. Purchase agreement was signed (see scanned documents).  Patient will return in 3 weeks for a hearing aid check.     Appointment time:  9:00-10:00

## 2025-02-18 ENCOUNTER — TELEPHONE (OUTPATIENT)
Dept: OBSTETRICS AND GYNECOLOGY | Facility: CLINIC | Age: 60
End: 2025-02-18
Payer: COMMERCIAL

## 2025-02-18 DIAGNOSIS — N76.3 CHRONIC VULVITIS: Primary | ICD-10-CM

## 2025-02-18 DIAGNOSIS — N95.2 POSTMENOPAUSAL ATROPHIC VAGINITIS: ICD-10-CM

## 2025-02-18 RX ORDER — ESTRADIOL 0.1 MG/G
0.5 CREAM VAGINAL 2 TIMES WEEKLY
Qty: 42.5 G | Refills: 1 | Status: SHIPPED | OUTPATIENT
Start: 2025-02-20

## 2025-02-18 RX ORDER — ESTRADIOL 0.1 MG/G
CREAM VAGINAL DAILY
COMMUNITY
End: 2025-02-18 | Stop reason: SDUPTHER

## 2025-02-18 RX ORDER — CLOTRIMAZOLE AND BETAMETHASONE DIPROPIONATE 10; .64 MG/G; MG/G
1 CREAM TOPICAL 2 TIMES DAILY
Qty: 15 G | Refills: 1 | Status: SHIPPED | OUTPATIENT
Start: 2025-02-18

## 2025-02-18 RX ORDER — CLOTRIMAZOLE AND BETAMETHASONE DIPROPIONATE 10; .64 MG/G; MG/G
1 CREAM TOPICAL 2 TIMES DAILY
COMMUNITY
End: 2025-02-18 | Stop reason: SDUPTHER

## 2025-02-18 NOTE — TELEPHONE ENCOUNTER
Pt calling states her pharmacy closed and requesting new rx for her estradiol and clotrimazole to be sent to her new pharmacy on file

## 2025-02-28 ENCOUNTER — APPOINTMENT (OUTPATIENT)
Dept: AUDIOLOGY | Facility: CLINIC | Age: 60
End: 2025-02-28

## 2025-02-28 DIAGNOSIS — H90.41 SENSORINEURAL HEARING LOSS (SNHL) OF RIGHT EAR WITH UNRESTRICTED HEARING OF LEFT EAR: Primary | ICD-10-CM

## 2025-02-28 PROCEDURE — HRANC PR HEARING AID NO CHARGE: Performed by: AUDIOLOGIST

## 2025-02-28 NOTE — PROGRESS NOTES
HEARING AID CHECK     RIGHT: RESOUND NEXIA 9 RECHARGEABLE JAMSHID WITH A #1 LP  AND MEDIUM OPEN DOME WITH RETENTION TAIL  S.N.: [id number]  LEFT: UNAIDED  WARRANTY EXPIRES: 2/23/2025     The patient reported that she is doing well with her hearing aid.  She stated that it does seem to make her tinnitus less noticeable.  Perfomed speechmapping and adjusted the hearing aid to approximate her targets for soft, moderate and loud speech inputs.  Increased the gain from 750-1000 Hz x2.  The patient reported that she could hear well after today's adjustments.  Gave her replacement domes.  Recall in one year or sooner if needed.        Appointment time:  9:00-9:30

## 2025-03-07 DIAGNOSIS — N95.2 POSTMENOPAUSAL ATROPHIC VAGINITIS: ICD-10-CM

## 2025-03-10 RX ORDER — ESTRADIOL 0.1 MG/G
0.5 CREAM VAGINAL 2 TIMES WEEKLY
Qty: 42.5 G | Refills: 0 | Status: SHIPPED | OUTPATIENT
Start: 2025-03-10

## 2025-04-25 ASSESSMENT — ENCOUNTER SYMPTOMS
COLOR CHANGE: 0
DIZZINESS: 0
ABDOMINAL DISTENTION: 0
HEADACHES: 0
ABDOMINAL PAIN: 0
ACTIVITY CHANGE: 0
ADENOPATHY: 0
CHEST TIGHTNESS: 0
JOINT SWELLING: 0
DYSURIA: 0
DIFFICULTY URINATING: 0
FATIGUE: 0
UNEXPECTED WEIGHT CHANGE: 0
SHORTNESS OF BREATH: 0
WEAKNESS: 0

## 2025-04-25 NOTE — PROGRESS NOTES
"Annual-menopause  Subjective   Katlin Bell is a 59 y.o.  female, last seen 2024, who is here for a routine exam.   Complaints:   denies vag bleed or discharge; denies pelvic  pain, pressure, or persistent bloating.  Medical History[1] triple neg breast ca age 36; hx JOVITA/BSO for endometriosis, per LV  ADOPTED  Last mamm 24 neg; hx L lumpec; hx R bx  DEXA  2012 nl spine/hip  Colonoscopy  ; rpt due 10 yrs   Review of Systems   Constitutional:  Negative for activity change, fatigue and unexpected weight change.   Respiratory:  Negative for chest tightness and shortness of breath.    Cardiovascular:  Negative for chest pain and leg swelling.   Gastrointestinal:  Negative for abdominal distention and abdominal pain.   Genitourinary:  Negative for difficulty urinating, dysuria, genital sores, pelvic pain, vaginal bleeding, vaginal discharge and vaginal pain.   Musculoskeletal:  Negative for gait problem and joint swelling.   Skin:  Negative for color change and rash.   Neurological:  Negative for dizziness, weakness and headaches.   Hematological:  Negative for adenopathy.   Objective Visit Vitals  /86   Ht 1.702 m (5' 7\")   Wt 92.1 kg (203 lb)   BMI 31.79 kg/m²   OB Status Hysterectomy   Smoking Status Former   BSA 2.09 m²       General:   Alert and oriented, in no acute distress   Neck: Supple. No visible thyromegaly.    Breast/Axilla: Normal to palpation bilaterally without masses, skin changes, or nipple discharge. LEFT breast upper outer quad incision from lumpectomy.  No palpable lymphadenopathy in axillary or clavicular regions; no nipple retractions or expressible discharge   Abdomen: Soft, non-tender, without masses or organomegaly   Vulva: Normal architecture without erythema, masses, or lesions.    Vagina: Normal mucosa without lesions, masses.  Positive atrophy. No abnormal vaginal discharge.    Cervix: Surgically absent   Uterus: Surgically absent   Adnexa: Surgically absent "   Pelvic Floor No POP noted. No high tone pelvic floor    Psych   Normal affect. Normal mood.      Assessment/Plan   Encounter Diagnoses   Name Primary?    Visit for gynecologic examination; no suspicious findings on breast or pelvic exams. Yes    History of hysterectomy; no further paps     Chronic vulvitis; improved with good pericare; encouraged      Atrophic vaginitis; reviewed need for ongoing topical ert to maintain tissue health.     Screen for colon cancer; and that last completed a colonoscopy in 2019; she was to follow-up in 2024; this has not yet been completed     Encounter for screening mammogram for malignant neoplasm of breast; order placed     History of breast cancer; no evid dz.       Renetta Carson MD           [1]   Past Medical History:  Diagnosis Date    Breast cancer (Multi)     Left breast    Cellulitis 11/07/2024    Endometriosis     Hearing loss     Hematuria 2014    hx of cystoscopy; Dr Gayle    Hx antineoplastic chemo     Hyperkalemia 11/07/2024    Personal history of irradiation     Psoriasis     Squamous cell cancer of skin of nose

## 2025-05-01 ENCOUNTER — OFFICE VISIT (OUTPATIENT)
Dept: OBSTETRICS AND GYNECOLOGY | Facility: CLINIC | Age: 60
End: 2025-05-01
Payer: COMMERCIAL

## 2025-05-01 VITALS
SYSTOLIC BLOOD PRESSURE: 122 MMHG | HEIGHT: 67 IN | DIASTOLIC BLOOD PRESSURE: 86 MMHG | BODY MASS INDEX: 31.86 KG/M2 | WEIGHT: 203 LBS

## 2025-05-01 DIAGNOSIS — N95.2 POSTMENOPAUSAL ATROPHIC VAGINITIS: ICD-10-CM

## 2025-05-01 DIAGNOSIS — N76.3 CHRONIC VULVITIS: ICD-10-CM

## 2025-05-01 DIAGNOSIS — Z01.419 VISIT FOR GYNECOLOGIC EXAMINATION: Primary | ICD-10-CM

## 2025-05-01 DIAGNOSIS — Z12.31 ENCOUNTER FOR SCREENING MAMMOGRAM FOR MALIGNANT NEOPLASM OF BREAST: ICD-10-CM

## 2025-05-01 DIAGNOSIS — N95.2 ATROPHIC VAGINITIS: ICD-10-CM

## 2025-05-01 DIAGNOSIS — Z12.11 SCREEN FOR COLON CANCER: ICD-10-CM

## 2025-05-01 DIAGNOSIS — Z85.3 HISTORY OF BREAST CANCER: ICD-10-CM

## 2025-05-01 DIAGNOSIS — Z90.710 HISTORY OF HYSTERECTOMY: ICD-10-CM

## 2025-05-01 PROCEDURE — 99396 PREV VISIT EST AGE 40-64: CPT | Performed by: OBSTETRICS & GYNECOLOGY

## 2025-05-01 PROCEDURE — 1036F TOBACCO NON-USER: CPT | Performed by: OBSTETRICS & GYNECOLOGY

## 2025-05-01 PROCEDURE — 3008F BODY MASS INDEX DOCD: CPT | Performed by: OBSTETRICS & GYNECOLOGY

## 2025-05-01 RX ORDER — ESTRADIOL 0.1 MG/G
0.5 CREAM VAGINAL 2 TIMES WEEKLY
Qty: 42.5 G | Refills: 0 | Status: SHIPPED | OUTPATIENT
Start: 2025-05-01

## 2025-05-01 ASSESSMENT — ENCOUNTER SYMPTOMS
DEPRESSION: 0
OCCASIONAL FEELINGS OF UNSTEADINESS: 0
LOSS OF SENSATION IN FEET: 0

## 2025-05-01 ASSESSMENT — PATIENT HEALTH QUESTIONNAIRE - PHQ9
2. FEELING DOWN, DEPRESSED OR HOPELESS: NOT AT ALL
SUM OF ALL RESPONSES TO PHQ9 QUESTIONS 1 & 2: 0
1. LITTLE INTEREST OR PLEASURE IN DOING THINGS: NOT AT ALL

## 2025-05-01 ASSESSMENT — LIFESTYLE VARIABLES
HOW OFTEN DO YOU HAVE SIX OR MORE DRINKS ON ONE OCCASION: NEVER
HOW OFTEN DO YOU HAVE A DRINK CONTAINING ALCOHOL: MONTHLY OR LESS
HOW MANY STANDARD DRINKS CONTAINING ALCOHOL DO YOU HAVE ON A TYPICAL DAY: 1 OR 2
AUDIT-C TOTAL SCORE: 1
SKIP TO QUESTIONS 9-10: 1

## 2025-05-01 ASSESSMENT — PAIN SCALES - GENERAL: PAINLEVEL_OUTOF10: 0-NO PAIN

## 2025-06-01 DIAGNOSIS — N95.2 POSTMENOPAUSAL ATROPHIC VAGINITIS: ICD-10-CM

## 2025-06-04 RX ORDER — ESTRADIOL 0.1 MG/G
0.5 CREAM VAGINAL 2 TIMES WEEKLY
Qty: 42.5 G | Refills: 1 | Status: SHIPPED | OUTPATIENT
Start: 2025-06-05

## 2025-06-16 ASSESSMENT — ENCOUNTER SYMPTOMS
VOMITING: 0
SORE THROAT: 0
RHINORRHEA: 0
HEADACHES: 1
ABDOMINAL PAIN: 0
DIARRHEA: 0
COUGH: 0
NECK PAIN: 0

## 2025-06-18 ASSESSMENT — ENCOUNTER SYMPTOMS
NECK PAIN: 0
COUGH: 0
RHINORRHEA: 0
ABDOMINAL PAIN: 0
HEADACHES: 1
SORE THROAT: 0
VOMITING: 0
DIARRHEA: 0

## 2025-06-18 NOTE — PROGRESS NOTES
[facility]: [facility]  PROGRESS NOTE      Katlin Bell is a 59 y.o. female that is presenting today for left ear pain.  States she developed the ear pain 2 weeks ago but has resolved.  No marked hearing loss in Lt. Ear, follows audiology for known partial hearing loss in Rt ear. Does report allergies and takes a daily allergy pill daily.  Denies Fever, chills, chest pain or SOB.  No Sinus pain or pressure, no post nasal drip, no ear or throat pain.  No fever, chills.       Assessment/Plan   Assessment & Plan  Left ear pain  No signs of infection. Prior ear pain resolved per pt.   - Mucinex advised for scant inner ear effusions.          Subjective   Earache   There is pain in the left ear. This is a new problem. The current episode started 1 to 4 weeks ago. The problem occurs constantly. The problem has been gradually worsening. There has been no fever. The pain is at a severity of 1/10. Associated symptoms include ear discharge, headaches and hearing loss. Pertinent negatives include no abdominal pain, coughing, diarrhea, neck pain, rash, rhinorrhea, sore throat or vomiting.     Review of Systems   Constitutional: Negative.  Negative for chills and fever.   HENT:  Positive for ear discharge, ear pain and hearing loss. Negative for rhinorrhea and sore throat.         Rt ear hearing aid   Respiratory: Negative.  Negative for cough.    Cardiovascular: Negative.    Gastrointestinal: Negative.  Negative for abdominal pain, diarrhea and vomiting.   Musculoskeletal:  Negative for neck pain.   Skin: Negative.  Negative for rash.   Neurological:  Positive for headaches.      Objective   Vitals:    06/19/25 1505   BP: 120/70   Pulse: 64   Temp: 36.3 °C (97.3 °F)   SpO2: 99%       Physical Exam  Vitals reviewed.   Constitutional:       Appearance: Normal appearance.   HENT:      Head: Normocephalic and atraumatic.      Right Ear: Tympanic membrane and external ear normal. Decreased hearing noted. No  drainage or tenderness. There is no impacted cerumen.      Left Ear: Hearing and external ear normal. No decreased hearing noted. No drainage or tenderness. A middle ear effusion is present. There is no impacted cerumen.      Nose: No rhinorrhea (allergy related).      Mouth/Throat:      Mouth: Mucous membranes are moist.      Pharynx: No oropharyngeal exudate or posterior oropharyngeal erythema.   Eyes:      Extraocular Movements: Extraocular movements intact.      Pupils: Pupils are equal, round, and reactive to light.   Cardiovascular:      Rate and Rhythm: Normal rate and regular rhythm.      Pulses: Normal pulses.      Heart sounds: Normal heart sounds. No murmur heard.  Pulmonary:      Effort: Pulmonary effort is normal.      Breath sounds: Normal breath sounds. No wheezing.   Abdominal:      General: Bowel sounds are normal.      Palpations: Abdomen is soft.   Skin:     General: Skin is warm and dry.   Neurological:      Mental Status: She is alert and oriented to person, place, and time.   Psychiatric:         Mood and Affect: Mood normal.         Behavior: Behavior normal.         Thought Content: Thought content normal.         Judgment: Judgment normal.       Vitals:    06/19/25 1505   BP: 120/70   Pulse: 64   Temp: 36.3 °C (97.3 °F)   SpO2: 99%       Diagnostic Results   Lab Results   Component Value Date    GLUCOSE 97 09/30/2024    CALCIUM 9.8 09/30/2024     09/30/2024    K 4.2 09/30/2024    CO2 28 09/30/2024     09/30/2024    BUN 13 09/30/2024    CREATININE 0.93 09/30/2024     Lab Results   Component Value Date    ALT 17 09/30/2024    AST 17 09/30/2024    ALKPHOS 48 09/30/2024    BILITOT 0.5 09/30/2024     Lab Results   Component Value Date    WBC 7.8 09/30/2024    HGB 13.6 09/30/2024    HCT 41.5 09/30/2024    MCV 91 09/30/2024     09/30/2024     Lab Results   Component Value Date    CHOL 194 12/16/2024    CHOL 253 (H) 12/28/2022    CHOL 189 11/25/2020     Lab Results   Component  "Value Date    HDL 48.8 2024    HDL 56 2022    HDL 45 (L) 2020     Lab Results   Component Value Date    LDLCALC 106 (H) 2024    LDLCALC 150 (H) 2022    LDLCALC 121 2020     Lab Results   Component Value Date    TRIG 198 (H) 2024    TRIG 237 (H) 2022    TRIG 116 2020     No components found for: \"CHOLHDL\"  Lab Results   Component Value Date    HGBA1C 5.4 2024     Other labs not included in the list above were reviewed either before or during this encounter.    History    Medical History[1]  Surgical History[2]  Family History[3]  Social History     Socioeconomic History    Marital status:      Spouse name: Not on file    Number of children: Not on file    Years of education: Not on file    Highest education level: Not on file   Occupational History    Not on file   Tobacco Use    Smoking status: Former     Current packs/day: 0.00     Average packs/day: 1 pack/day for 15.0 years (15.0 ttl pk-yrs)     Types: Cigarettes     Quit date: 1995     Years since quittin.6    Smokeless tobacco: Never    Tobacco comments:     30+ years ago   Vaping Use    Vaping status: Never Used   Substance and Sexual Activity    Alcohol use: Yes     Comment: Maybe once monthly    Drug use: Never    Sexual activity: Yes     Partners: Male     Birth control/protection: Post-menopausal, Female Sterilization   Other Topics Concern    Not on file   Social History Narrative    Not on file     Social Drivers of Health     Financial Resource Strain: Not on file   Food Insecurity: Not on file   Transportation Needs: Not on file   Physical Activity: Not on file   Stress: Not on file   Social Connections: Not on file   Intimate Partner Violence: Not on file   Housing Stability: Not on file     Allergies[4]  Medications Ordered Prior to Encounter[5]  Immunization History   Administered Date(s) Administered    Flu vaccine (IIV4), preservative free *Check age/dose* 2017, " 11/04/2019, 10/05/2020, 10/18/2021, 10/23/2022    Flu vaccine, quadrivalent, no egg protein, age 6 month or greater (FLUCELVAX) 10/15/2018    Flu vaccine, trivalent, preservative free, age 6 months and greater (Fluarix/Fluzone/Flulaval) 10/10/2024    Influenza, injectable, quadrivalent 10/30/2017    Influenza, seasonal, injectable 01/21/2013, 10/15/2013, 09/12/2014, 10/01/2014, 10/22/2015    Moderna SARS-CoV-2 Vaccination 03/31/2021, 04/28/2021, 11/17/2021    Pfizer COVID-19 vaccine, 12 years and older, (30mcg/0.3mL) (Comirnaty) 01/16/2024    Pfizer COVID-19 vaccine, bivalent, age 12 years and older (30 mcg/0.3 mL) 10/23/2022    Tdap vaccine, age 7 year and older (BOOSTRIX, ADACEL) 03/25/2011, 03/17/2016    Zoster vaccine, recombinant, adult (SHINGRIX) 06/06/2022, 09/26/2022     Patient's medical history was reviewed and updated either before or during this encounter.       Teri Pizarro, APRN-CNP         [1]   Past Medical History:  Diagnosis Date    Allergic 8/2023    Breast cancer     Left breast    Cellulitis 11/07/2024    Endometriosis     GERD (gastroesophageal reflux disease) 6/2024    Hearing loss     Hematuria 2014    hx of cystoscopy; Dr Gayle    Hx antineoplastic chemo     Hyperkalemia 11/07/2024    Personal history of irradiation     Psoriasis     Squamous cell cancer of skin of nose    [2]   Past Surgical History:  Procedure Laterality Date    ADENOIDECTOMY  6/1972    BREAST BIOPSY Left     BREAST LUMPECTOMY Left 01/2002    LYMPH NODES REMOVED; Dr Escobedo    FLEXIBLE SIGMOIDOSCOPY  2010    HYSTERECTOMY      SKIN CANCER EXCISION  2011    squamous cell CA, from nose; Dr Arias    TONSILLECTOMY     [3]   Family History  Adopted: Yes   [4]   Allergies  Allergen Reactions    Penicillin Anaphylaxis    Fluconazole Hives    Terbinafine Hcl Other     achey   [5]   Current Outpatient Medications on File Prior to Visit   Medication Sig Dispense Refill    clotrimazole-betamethasone (Lotrisone) cream Apply 1  Application topically 2 times a day. 15 g 1    estradiol (Estrace) 0.01 % (0.1 mg/gram) vaginal cream Insert 0.125 Applicatorfuls (0.5 g) into the vagina 2 times a week. 42.5 g 1    multivitamin (MULTIPLE VITAMINS ORAL) Take by mouth.      Taltz Autoinjector, 2 Pack, 80 mg/mL injection Inject under the skin every 14 (fourteen) days.       No current facility-administered medications on file prior to visit.

## 2025-06-19 ENCOUNTER — OFFICE VISIT (OUTPATIENT)
Dept: PRIMARY CARE | Facility: CLINIC | Age: 60
End: 2025-06-19
Payer: COMMERCIAL

## 2025-06-19 VITALS
OXYGEN SATURATION: 99 % | TEMPERATURE: 97.3 F | BODY MASS INDEX: 32.33 KG/M2 | SYSTOLIC BLOOD PRESSURE: 120 MMHG | DIASTOLIC BLOOD PRESSURE: 70 MMHG | HEIGHT: 67 IN | WEIGHT: 206 LBS | HEART RATE: 64 BPM

## 2025-06-19 DIAGNOSIS — H92.02 LEFT EAR PAIN: Primary | ICD-10-CM

## 2025-06-19 PROCEDURE — 99213 OFFICE O/P EST LOW 20 MIN: CPT | Performed by: NURSE PRACTITIONER

## 2025-06-19 PROCEDURE — 3008F BODY MASS INDEX DOCD: CPT | Performed by: NURSE PRACTITIONER

## 2025-06-19 PROCEDURE — 1036F TOBACCO NON-USER: CPT | Performed by: NURSE PRACTITIONER

## 2025-06-19 RX ORDER — PNV NO.95/FERROUS FUM/FOLIC AC 28MG-0.8MG
100 TABLET ORAL DAILY
COMMUNITY

## 2025-06-19 ASSESSMENT — ENCOUNTER SYMPTOMS
CHILLS: 0
RESPIRATORY NEGATIVE: 1
CARDIOVASCULAR NEGATIVE: 1
CONSTITUTIONAL NEGATIVE: 1
GASTROINTESTINAL NEGATIVE: 1
FEVER: 0

## 2025-06-19 ASSESSMENT — PATIENT HEALTH QUESTIONNAIRE - PHQ9
1. LITTLE INTEREST OR PLEASURE IN DOING THINGS: NOT AT ALL
2. FEELING DOWN, DEPRESSED OR HOPELESS: NOT AT ALL
SUM OF ALL RESPONSES TO PHQ9 QUESTIONS 1 AND 2: 0

## 2025-06-19 ASSESSMENT — PAIN SCALES - GENERAL: PAINLEVEL_OUTOF10: 0-NO PAIN

## 2025-06-23 ENCOUNTER — HOSPITAL ENCOUNTER (OUTPATIENT)
Dept: RADIOLOGY | Facility: CLINIC | Age: 60
Discharge: HOME | End: 2025-06-23
Payer: COMMERCIAL

## 2025-06-23 VITALS — BODY MASS INDEX: 31.86 KG/M2 | HEIGHT: 67 IN | WEIGHT: 203 LBS

## 2025-06-23 DIAGNOSIS — Z85.3 HISTORY OF BREAST CANCER: ICD-10-CM

## 2025-06-23 DIAGNOSIS — Z12.31 ENCOUNTER FOR SCREENING MAMMOGRAM FOR MALIGNANT NEOPLASM OF BREAST: ICD-10-CM

## 2025-06-23 PROCEDURE — 77063 BREAST TOMOSYNTHESIS BI: CPT | Performed by: RADIOLOGY

## 2025-06-23 PROCEDURE — 77063 BREAST TOMOSYNTHESIS BI: CPT

## 2025-06-23 PROCEDURE — 77067 SCR MAMMO BI INCL CAD: CPT | Performed by: RADIOLOGY

## 2025-06-25 ENCOUNTER — APPOINTMENT (OUTPATIENT)
Dept: OBSTETRICS AND GYNECOLOGY | Facility: CLINIC | Age: 60
End: 2025-06-25
Payer: COMMERCIAL

## 2025-07-01 ENCOUNTER — OFFICE VISIT (OUTPATIENT)
Dept: OTOLARYNGOLOGY | Facility: CLINIC | Age: 60
End: 2025-07-01
Payer: COMMERCIAL

## 2025-07-01 ENCOUNTER — TELEPHONE (OUTPATIENT)
Dept: OTOLARYNGOLOGY | Facility: CLINIC | Age: 60
End: 2025-07-01

## 2025-07-01 VITALS — BODY MASS INDEX: 31.39 KG/M2 | WEIGHT: 200 LBS | HEIGHT: 67 IN

## 2025-07-01 DIAGNOSIS — H92.02 LEFT EAR PAIN: ICD-10-CM

## 2025-07-01 DIAGNOSIS — H90.41 SENSORINEURAL HEARING LOSS (SNHL) OF RIGHT EAR WITH UNRESTRICTED HEARING OF LEFT EAR: ICD-10-CM

## 2025-07-01 DIAGNOSIS — B99.9 INFECTION: Primary | ICD-10-CM

## 2025-07-01 DIAGNOSIS — H92.12 OTORRHEA OF LEFT EAR: ICD-10-CM

## 2025-07-01 DIAGNOSIS — H92.10 OTORRHEA, UNSPECIFIED LATERALITY: ICD-10-CM

## 2025-07-01 DIAGNOSIS — H60.509 ACUTE OTITIS EXTERNA, UNSPECIFIED LATERALITY, UNSPECIFIED TYPE: Primary | ICD-10-CM

## 2025-07-01 PROCEDURE — 1036F TOBACCO NON-USER: CPT | Performed by: OTOLARYNGOLOGY

## 2025-07-01 PROCEDURE — 3008F BODY MASS INDEX DOCD: CPT | Performed by: OTOLARYNGOLOGY

## 2025-07-01 PROCEDURE — 99204 OFFICE O/P NEW MOD 45 MIN: CPT | Performed by: OTOLARYNGOLOGY

## 2025-07-01 RX ORDER — MUPIROCIN CALCIUM 20 MG/G
CREAM TOPICAL 3 TIMES DAILY
Qty: 15 G | Refills: 1 | Status: SHIPPED | OUTPATIENT
Start: 2025-07-01 | End: 2025-07-11

## 2025-07-01 RX ORDER — CIPROFLOXACIN AND DEXAMETHASONE 3; 1 MG/ML; MG/ML
SUSPENSION/ DROPS AURICULAR (OTIC)
Qty: 7.5 ML | Refills: 1 | Status: SHIPPED | OUTPATIENT
Start: 2025-07-01

## 2025-07-01 RX ORDER — MUPIROCIN 20 MG/G
OINTMENT TOPICAL
Qty: 22 G | Refills: 1 | Status: SHIPPED | OUTPATIENT
Start: 2025-07-01

## 2025-07-01 NOTE — PROGRESS NOTES
"Chief Complaint   Patient presents with    New Patient Visit     NP- LT EAR INFECTION      HPI:  Katlin Bell is a 59 y.o. female who complains of 3-week history of some left ear pain, weeping drainage and some crustiness.  No hearing trouble or tinnitus.  No treatment other than Mucinex.  She is on a biologic for psoriasis.  He does use Q-tips.  She denies any hearing loss on the left  She does have a known history of sudden right-sided low-frequency sensorineural hearing loss around 2018.  She does wear a hearing aid currently on the right.  She had normal MRI of the brain and IACs on December 29, 2018.  I did review an audiogram from earlier this year which does show essentially normal hearing on the left with low-frequency moderate sensorineural hearing loss    PMH:  Medical History[1]  Surgical History[2]      Medications:   Current Medications[3]     Allergies:  Allergies[4]     ROS:  Review of systems normal unless stated otherwise in the HPI and/or PMH.    Physical Exam:  Height 1.702 m (5' 7\"), weight 90.7 kg (200 lb). Body mass index is 31.32 kg/m².     GENERAL APPEARANCE: Well developed and well nourished.  Alert and oriented in no acute distress.  Normal vocal quality.      HEAD/FACE: No erythema or edema or facial tenderness.  Normal facial nerve function bilaterally.    EAR:       EXTERNAL: Normal pinnas and right external auditory canal without lesion or obstructing wax.  Left after canal laterally has some excoriation, edema, drainage, and tenderness.  There is a small break in the skin.       MIDDLE EAR: Tympanic membranes intact and mobile with normal landmarks.  Middle ear space appears well aerated.       TUBE STATUS: N/A       MASTOID CAVITY: N/A       HEARING: Gross hearing assessment is not within normal limits on the right.      NOSE:       VISUALIZED USING: Anterior rhinoscopy with headlight and nasal speculum.       DORSUM: Midline, nontraumatic appearance.       MUCOSA: " Normal-appearing.       SECRETIONS: Normal.       SEPTUM: Midline and nonobstructing.       INFERIOR TURBINATES: Normal.       MIDDLE TURBINATES/MEATUS: N/A       BLEEDING: N/A         ORAL CAVITY/PHARYNX:       TEETH: Adequate dentition.       TONGUE: No mass or lesion.  Normal mobility.       FLOOR OF MOUTH: No mass or lesion.       PALATE: Normal hard palate, soft palate, and uvula.       OROPHARYNX: Normal without mass or lesion.  Tonsils absent       BUCCAL MUCOSA/GBS: Normal without mass or lesion.       LIPS: Normal.    LARYNX/HYPOPHARYNX/NASOPHARYNX: N/A    NECK: No palpable masses or abnormal adenopathy.  Trachea is midline.    THYROID: No thyromegaly or palpable nodule.    SALIVARY GLANDS: Normal bilateral parotid and submandibular glands by inspection and palpation.    TMJ's: Normal.    NEURO: Cranial nerve exam grossly normal bilaterally.       Assessment/Plan   Katlin was seen today for new patient visit.  Diagnoses and all orders for this visit:  Acute otitis externa, unspecified laterality, unspecified type (Primary)  -     ciprofloxacin-dexamethasone (CiproDEX) otic suspension; 4 drops to affected ear/s twice daily for 10 days. 1 bottle. 1 refill.  -     mupirocin (Bactroban) 2 % cream; Apply topically 3 times a day for 10 days.  Left ear pain  Otorrhea of left ear  Sensorineural hearing loss (SNHL) of right ear with unrestricted hearing of left ear  Otorrhea, unspecified laterality  -     ciprofloxacin-dexamethasone (CiproDEX) otic suspension; 4 drops to affected ear/s twice daily for 10 days. 1 bottle. 1 refill.     She has acute left-sided otitis externa.  Will treat this with drops and as it is somewhat lateral also some mupirocin cream.  Educated about avoidance of manipulation and Q-tips.  Call or follow-up next week if not getting better or go to the emergency room sooner.  Her chronic right-sided asymmetric hearing loss was evaluated today as well from her audiogram earlier this year and  does remain stable.  Continue her hearing aid and check yearly.  Follow up if symptoms worsen or fail to improve.     Jose Colon MD         [1]   Past Medical History:  Diagnosis Date    Allergic 8/2023    Breast cancer June 2002    Left breast    Breast cyst 01/2000    Cellulitis 11/07/2024    Endometriosis     GERD (gastroesophageal reflux disease) 6/2024    Hearing loss     Hematuria 2014    hx of cystoscopy; Dr Gayle    Hx antineoplastic chemo June 2002    Hyperkalemia 11/07/2024    Personal history of irradiation August 2002    Psoriasis     Squamous cell cancer of skin of nose    [2]   Past Surgical History:  Procedure Laterality Date    ADENOIDECTOMY  6/1972    BREAST BIOPSY Left 2001    BREAST CYST ASPIRATION  2001    BREAST LUMPECTOMY Left 01/2002    LYMPH NODES REMOVED; Dr Escobedo    FLEXIBLE SIGMOIDOSCOPY  2010    HYSTERECTOMY  6/2004    OOPHORECTOMY  6/2004    SKIN CANCER EXCISION  2011    squamous cell CA, from nose; Dr Arias    TONSILLECTOMY     [3]   Current Outpatient Medications:     clotrimazole-betamethasone (Lotrisone) cream, Apply 1 Application topically 2 times a day., Disp: 15 g, Rfl: 1    cyanocobalamin (Vitamin B-12) 100 mcg tablet, Take 1 tablet (100 mcg) by mouth once daily., Disp: , Rfl:     ECHINACEA ORAL, Take by mouth., Disp: , Rfl:     estradiol (Estrace) 0.01 % (0.1 mg/gram) vaginal cream, Insert 0.125 Applicatorfuls (0.5 g) into the vagina 2 times a week., Disp: 42.5 g, Rfl: 1    multivitamin (MULTIPLE VITAMINS ORAL), Take by mouth., Disp: , Rfl:     Taltz Autoinjector, 2 Pack, 80 mg/mL injection, Inject under the skin every 14 (fourteen) days., Disp: , Rfl:     ciprofloxacin-dexamethasone (CiproDEX) otic suspension, 4 drops to affected ear/s twice daily for 10 days. 1 bottle. 1 refill., Disp: 7.5 mL, Rfl: 1    mupirocin (Bactroban) 2 % cream, Apply topically 3 times a day for 10 days., Disp: 15 g, Rfl: 1  [4]   Allergies  Allergen Reactions    Penicillin Anaphylaxis     Fluconazole Hives    Terbinafine Hcl Other     achey

## 2025-07-01 NOTE — TELEPHONE ENCOUNTER
Seen today and Mupirocin cream was rx'd - it requires a PA. The preferred alternative is the ointment. Could that be sent instead or is there a specific reason for the cream if I submit the PA?

## 2025-08-25 DIAGNOSIS — N95.2 POSTMENOPAUSAL ATROPHIC VAGINITIS: ICD-10-CM

## 2025-08-26 RX ORDER — ESTRADIOL 0.1 MG/G
CREAM VAGINAL
Qty: 42.5 G | Refills: 1 | Status: SHIPPED | OUTPATIENT
Start: 2025-08-26